# Patient Record
Sex: FEMALE | HISPANIC OR LATINO | URBAN - METROPOLITAN AREA
[De-identification: names, ages, dates, MRNs, and addresses within clinical notes are randomized per-mention and may not be internally consistent; named-entity substitution may affect disease eponyms.]

---

## 2024-06-24 ENCOUNTER — TRANSCRIBE ORDERS (OUTPATIENT)
Dept: ADMINISTRATIVE | Facility: HOSPITAL | Age: 49
End: 2024-06-24

## 2024-06-24 DIAGNOSIS — Z12.31 VISIT FOR SCREENING MAMMOGRAM: Primary | ICD-10-CM

## 2024-07-16 ENCOUNTER — HOSPITAL ENCOUNTER (OUTPATIENT)
Dept: MAMMOGRAPHY | Facility: HOSPITAL | Age: 49
Discharge: HOME | End: 2024-07-16
Admitting: INTERNAL MEDICINE
Payer: COMMERCIAL

## 2024-07-16 DIAGNOSIS — Z12.31 VISIT FOR SCREENING MAMMOGRAM: ICD-10-CM

## 2024-07-16 PROCEDURE — 77063 BREAST TOMOSYNTHESIS BI: CPT

## 2024-07-16 PROCEDURE — 77067 SCR MAMMO BI INCL CAD: CPT

## 2025-02-15 ENCOUNTER — HOSPITAL ENCOUNTER (EMERGENCY)
Facility: HOSPITAL | Age: 50
Discharge: HOME | End: 2025-02-15
Attending: EMERGENCY MEDICINE
Payer: COMMERCIAL

## 2025-02-15 VITALS
OXYGEN SATURATION: 100 % | HEIGHT: 64 IN | TEMPERATURE: 98.2 F | SYSTOLIC BLOOD PRESSURE: 126 MMHG | HEART RATE: 73 BPM | RESPIRATION RATE: 16 BRPM | DIASTOLIC BLOOD PRESSURE: 91 MMHG | WEIGHT: 220.68 LBS | BODY MASS INDEX: 37.68 KG/M2

## 2025-02-15 DIAGNOSIS — T78.40XA ALLERGIC REACTION, INITIAL ENCOUNTER: ICD-10-CM

## 2025-02-15 DIAGNOSIS — R60.0 FACIAL EDEMA: Primary | ICD-10-CM

## 2025-02-15 PROCEDURE — 25010000002 METHYLPREDNISOLONE PER 125 MG: Performed by: EMERGENCY MEDICINE

## 2025-02-15 PROCEDURE — 96372 THER/PROPH/DIAG INJ SC/IM: CPT

## 2025-02-15 PROCEDURE — 99282 EMERGENCY DEPT VISIT SF MDM: CPT

## 2025-02-15 RX ORDER — METHYLPREDNISOLONE 4 MG/1
TABLET ORAL
Qty: 1 EACH | Refills: 0 | Status: SHIPPED | OUTPATIENT
Start: 2025-02-15

## 2025-02-15 RX ORDER — METHYLPREDNISOLONE SODIUM SUCCINATE 125 MG/2ML
80 INJECTION, POWDER, LYOPHILIZED, FOR SOLUTION INTRAMUSCULAR; INTRAVENOUS ONCE
Status: COMPLETED | OUTPATIENT
Start: 2025-02-15 | End: 2025-02-15

## 2025-02-15 RX ADMIN — METHYLPREDNISOLONE SODIUM SUCCINATE 80 MG: 125 INJECTION INTRAMUSCULAR; INTRAVENOUS at 11:33

## 2025-02-15 NOTE — DISCHARGE INSTRUCTIONS
May begin Medrol Dosepak tomorrow.  May continue with antihistamine for allergy.  Follow-up with prior provider, return new or worsening symptoms

## 2025-02-15 NOTE — Clinical Note
[facility] EMERGENCY DEPARTMENT  [address] [place] [address]  Phone: [phone]    Giovanna Barrera was seen and treated in our emergency department on 2/15/2025.  She may return to work on 02/18/2025.         Thank you for choosing [facility].    Orion Queen MD

## 2025-02-15 NOTE — ED PROVIDER NOTES
"Subjective   History of Present Illness  49-year-old female presents with facial swelling.  She reports this started about 5 days ago.  She had taken Benadryl and Zyrtec.  She has had no fever.  No pain.  No trouble breathing.  She has had some generalized itching.  She has had no new exposures that she can think of.  Review of Systems    No past medical history on file.  Hypothyroid  Allergies   Allergen Reactions    Penicillins Swelling       No past surgical history on file.    No family history on file.    Social History     Socioeconomic History    Marital status:      Current medications levothyroxine      Objective   Physical Exam  49-year-old female awake alert.  Generally well-developed well-nourished.  She does have some facial edema.  Pupils equal round react light.  Airway patent.  Neck supple chest clear cardiovascular rate and rhythm ab soft nontender she has no edema of hands.  No JVD.  Skin without rash noted.  Procedures           ED Course      No results found for this or any previous visit.  No orders to display     Medications   methylPREDNISolone sodium succinate (SOLU-Medrol) injection 80 mg (80 mg Intramuscular Given 2/15/25 1133)     /72   Pulse 73   Temp 98.2 °F (36.8 °C) (Oral)   Resp 16   Ht 162.6 cm (64\")   Wt 100 kg (220 lb 10.9 oz)   LMP 02/03/2025 (Approximate)   SpO2 95%   BMI 37.88 kg/m²                                                    Medical Decision Making  Problems Addressed:  Allergic reaction, initial encounter: complicated acute illness or injury  Facial edema: complicated acute illness or injury    Risk  Prescription drug management.    Patient's findings were discussed with her.  Appears to be allergic reaction she does not have any other symptoms.  She was given dose of Depo-Medrol.  She was given prescription for Medrol Dosepak.  Advised can continue with antihistamine.  To follow-up with her prime provider return any new or worsening " symptoms    Final diagnoses:   Facial edema   Allergic reaction, initial encounter       ED Disposition  ED Disposition       ED Disposition   Discharge    Condition   Stable    Comment   --               Khari Louis MD  [address] [address]  [phone]               Medication List        New Prescriptions      methylPREDNISolone 4 MG dose pack  Commonly known as: MEDROL  Take as directed on package instructions.               Where to Get Your Medications        These medications were sent to [id number] - [facility] [place] - [address] AT [address] [phone] [facility] [phone]   [address] [place] [address]      Phone: [phone]   methylPREDNISolone 4 MG dose pack            Orion Queen MD  02/15/25 9201

## 2025-05-27 ENCOUNTER — APPOINTMENT (OUTPATIENT)
Dept: GENERAL RADIOLOGY | Facility: HOSPITAL | Age: 50
End: 2025-05-27
Payer: COMMERCIAL

## 2025-05-27 ENCOUNTER — HOSPITAL ENCOUNTER (EMERGENCY)
Facility: HOSPITAL | Age: 50
Discharge: HOME | End: 2025-05-27
Attending: EMERGENCY MEDICINE | Admitting: EMERGENCY MEDICINE
Payer: COMMERCIAL

## 2025-05-27 VITALS
SYSTOLIC BLOOD PRESSURE: 139 MMHG | WEIGHT: 214.07 LBS | HEART RATE: 68 BPM | RESPIRATION RATE: 18 BRPM | HEIGHT: 64 IN | DIASTOLIC BLOOD PRESSURE: 68 MMHG | BODY MASS INDEX: 36.55 KG/M2 | OXYGEN SATURATION: 100 % | TEMPERATURE: 98.5 F

## 2025-05-27 DIAGNOSIS — J20.9 ACUTE BRONCHITIS, UNSPECIFIED ORGANISM: Primary | ICD-10-CM

## 2025-05-27 DIAGNOSIS — R07.81 PLEURITIC CHEST PAIN: ICD-10-CM

## 2025-05-27 DIAGNOSIS — R05.3 PERSISTENT COUGH: ICD-10-CM

## 2025-05-27 LAB
FLUAV RNA RESP QL NAA+PROBE: NOT DETECTED
FLUBV RNA RESP QL NAA+PROBE: NOT DETECTED
RSV RNA RESP QL NAA+PROBE: NOT DETECTED
SARS-COV-2 RNA RESP QL NAA+PROBE: NOT DETECTED

## 2025-05-27 PROCEDURE — 94761 N-INVAS EAR/PLS OXIMETRY MLT: CPT

## 2025-05-27 PROCEDURE — 94664 DEMO&/EVAL PT USE INHALER: CPT

## 2025-05-27 PROCEDURE — 71046 X-RAY EXAM CHEST 2 VIEWS: CPT

## 2025-05-27 PROCEDURE — 94799 UNLISTED PULMONARY SVC/PX: CPT

## 2025-05-27 PROCEDURE — 99283 EMERGENCY DEPT VISIT LOW MDM: CPT

## 2025-05-27 PROCEDURE — 87637 SARSCOV2&INF A&B&RSV AMP PRB: CPT | Performed by: EMERGENCY MEDICINE

## 2025-05-27 PROCEDURE — 94640 AIRWAY INHALATION TREATMENT: CPT

## 2025-05-27 RX ORDER — DOXYCYCLINE 100 MG/1
100 CAPSULE ORAL 2 TIMES DAILY
Qty: 10 CAPSULE | Refills: 0 | Status: SHIPPED | OUTPATIENT
Start: 2025-05-27

## 2025-05-27 RX ORDER — CODEINE PHOSPHATE AND GUAIFENESIN 10; 100 MG/5ML; MG/5ML
5 SOLUTION ORAL 3 TIMES DAILY PRN
Qty: 120 ML | Refills: 0 | Status: SHIPPED | OUTPATIENT
Start: 2025-05-27

## 2025-05-27 RX ORDER — ALBUTEROL SULFATE 90 UG/1
2 INHALANT RESPIRATORY (INHALATION) EVERY 4 HOURS PRN
Qty: 6.7 G | Refills: 0 | Status: SHIPPED | OUTPATIENT
Start: 2025-05-27

## 2025-05-27 RX ORDER — ALBUTEROL SULFATE 0.83 MG/ML
2.5 SOLUTION RESPIRATORY (INHALATION) ONCE
Status: COMPLETED | OUTPATIENT
Start: 2025-05-27 | End: 2025-05-27

## 2025-05-27 RX ADMIN — ALBUTEROL SULFATE 2.5 MG: 2.5 SOLUTION RESPIRATORY (INHALATION) at 14:13

## 2025-05-27 NOTE — DISCHARGE INSTRUCTIONS
Rest, no work or exertion, next 3 days  Tylenol or ibuprofen as needed for discomfort  Medication as directed  Follow-up with primary care provider

## 2025-05-27 NOTE — Clinical Note
[facility] EMERGENCY DEPARTMENT  [address] [place] [address]  Phone: [phone]    Giovanna Barrera was seen and treated in our emergency department on 5/27/2025.  She may return to work on 05/31/2025.         Thank you for choosing [facility].    Joel Mckenna MD

## 2025-05-27 NOTE — ED PROVIDER NOTES
Subjective   History of Present Illness  50-year-old female with history of recent cough she states initially nonproductive then became productive yellow sputum the patient was apparently seen at the [facility] on the 20th and started on cephalexin and Stahist which she states has not helped her symptoms.  She continues to have subjective fever chills and productive cough she reports no leg pain or swelling she denies night sweats or hemoptysis she denies orthopnea or recent weight changes.  She denies diaphoresis or palpitations      Review of Systems   Respiratory:  Positive for cough, chest tightness and shortness of breath. Negative for wheezing.        No past medical history on file.  History of hypothyroidism  Allergies   Allergen Reactions    Penicillins Swelling       No past surgical history on file.    No family history on file.    Social History     Socioeconomic History    Marital status:      No recent unusual food water travel or activity no history of previous positive TB test      Objective   Physical Exam  Alert [place] Coma Scale 15   HEENT: Pupils equal and reactive to light. Conjunctivae are not injected. Normal tympanic membranes. Oropharynx and nares are normal.   Neck: Supple. Midline trachea. No JVD. No goiter.   Chest: Extensive rhonchi are noted bilateral and equal breath sounds bilaterally, regular rate and rhythm without murmur or rub.   Abdomen: Positive bowel sounds, nontender, nondistended. No rebound or peritoneal signs. No CVA tenderness.   Extremities no clubbing. cyanosis or edema. Motor sensory exam is normal. The full range of motion is intact   Skin: Warm and dry, no rashes or petechia.   Lymphatic: No regional lymphadenopathy. No calf pain, swelling or Homans sign    Procedures           ED Course      Labs Reviewed   COVID-19/FLUA&B/RSV, NP SWAB IN TRANSPORT MEDIA 1 HR TAT - Normal    Narrative:     Fact sheet for providers: https://www.fda.gov/media/540928/download     Fact sheet for patients: https://www.fda.gov/media/529683/download    Test performed by PCR.     Medications   albuterol (PROVENTIL) nebulizer solution 0.083% 2.5 mg/3mL (2.5 mg Nebulization Given 5/27/25 1413)     XR Chest 2 View  Result Date: 5/27/2025  Impression: No acute cardiopulmonary findings. Electronically Signed: Paolo Morley MD  5/27/2025 1:44 PM EDT  Workstation ID: [id number]                                                       Medical Decision Making  Patient will be discharged a prescription for doxycycline, Cheratussin AC, albuterol inhaler the patient was advised to rest this week and to follow-up with a primary care provider the patient was stable at discharge and vocalized understanding of discharge instructions and warning    Amount and/or Complexity of Data Reviewed  Independent Historian: spouse  Labs: ordered. Decision-making details documented in ED Course.  Radiology: ordered and independent interpretation performed.    Risk  OTC drugs.  Prescription drug management.        Final diagnoses:   Acute bronchitis, unspecified organism   Pleuritic chest pain   Persistent cough       ED Disposition  ED Disposition       ED Disposition   Discharge    Condition   Stable    Comment   --               Khari Louis MD  [address] [id number]  [phone]               Medication List      No changes were made to your prescriptions during this visit.            Joel Mckenna MD  05/27/25 9822

## 2025-08-14 ENCOUNTER — HOSPITAL ENCOUNTER (OUTPATIENT)
Dept: ULTRASOUND IMAGING | Facility: HOSPITAL | Age: 50
Discharge: HOME | End: 2025-08-14
Payer: COMMERCIAL

## 2025-08-14 ENCOUNTER — HOSPITAL ENCOUNTER (OUTPATIENT)
Dept: MAMMOGRAPHY | Facility: HOSPITAL | Age: 50
Discharge: HOME | End: 2025-08-14
Payer: COMMERCIAL

## 2025-08-14 DIAGNOSIS — R92.8 ABNORMAL MAMMOGRAM: ICD-10-CM

## 2025-08-14 PROCEDURE — 76642 ULTRASOUND BREAST LIMITED: CPT

## 2025-08-14 PROCEDURE — G0279 TOMOSYNTHESIS, MAMMO: HCPCS

## 2025-08-14 PROCEDURE — 77065 DX MAMMO INCL CAD UNI: CPT
